# Patient Record
Sex: MALE | Race: WHITE
[De-identification: names, ages, dates, MRNs, and addresses within clinical notes are randomized per-mention and may not be internally consistent; named-entity substitution may affect disease eponyms.]

---

## 2022-08-06 ENCOUNTER — HOSPITAL ENCOUNTER (OUTPATIENT)
Dept: HOSPITAL 47 - RADCTMAIN | Age: 37
Discharge: HOME | End: 2022-08-06
Attending: INTERNAL MEDICINE
Payer: MEDICARE

## 2022-08-06 DIAGNOSIS — K76.0: Primary | ICD-10-CM

## 2022-08-06 PROCEDURE — 74176 CT ABD & PELVIS W/O CONTRAST: CPT

## 2022-08-06 NOTE — CT
EXAMINATION TYPE: CT abdomen pelvis wo con

CT DLP: 1856.6 mGycm, Automated exposure control for dose reduction was used.

 

DATE OF EXAM: 8/6/2022 8:09 AM

 

COMPARISON:  CT abdomen pelvis most recent from 1/12/2011.

 

CLINICAL INDICATION:Male, 36 years old with history of R10.9 KIDNEY STONES; Lower abdominal/pelvic pa
in radiating into upper abdomen

 

TECHNIQUE:  Axial CT of the abdomen and pelvis. Sagittal and coronal reformats were created on a [employer] workstation. 

 

Contrast used: None

Oral contrast used: without Oral Contrast 

 

FINDINGS: 

LOWER CHEST: Unremarkable

 

ABDOMEN

LIVER: Diffusely hypoattenuating parenchyma. Fatty sparing around the gallbladder fossa.

GALLBLADDER AND BILE DUCTS: Unremarkable.

PANCREAS: Unremarkable.

SPLEEN: Unremarkable.

ADRENAL GLANDS: Unremarkable.

KIDNEYS AND URETERS: No evidence of hydronephrosis or renal calculus. The ureters are unremarkable.  


 

PELVIS

BLADDER: Unremarkable

REPRODUCTIVE: Unremarkable.

 

ABDOMEN & PELVIS

STOMACH AND BOWEL: No evidence of bowel obstruction. Appendix is normal

PERITONEUM: No evidence of pneumoperitoneum or free fluid.

 

VASCULATURE: No evidence of aortic aneurysm. 

MUSCULOSKELETAL: No acute osseous abnormalities

LYMPH NODES: No gross evidence for lymphadenopathy.

SOFT TISSUE/ABDOMINAL WALL: Fat filled umbilical hernia measuring 10 mm at the neck.

 

IMPRESSION:

1.  No evidence of acute intra-abdominal process. Normal appendix, no urolithiasis or hydronephrosis.


2. Hepatic steatosis.